# Patient Record
(demographics unavailable — no encounter records)

---

## 2024-10-08 NOTE — HISTORY OF PRESENT ILLNESS
[de-identified] : FLACA YEAGER is a 12 year old patient With obstructive sleep apnea and tonsil hypertrophy.  She is here for evaluation for tonsillectomy.  Her mother said that she has nasal obstruction, difficulty sleeping with gasping for air and pauses in her sleep.  She has occasional bedwetting and daytime fatigue.  She also has a history of recurrent tonsillitis with frequent infections.  She had a sleep study in the [place] last year.  She does not have the results with her but she said that there was severe obstructive sleep apnea.  They saw another ENT who recommended tonsillectomy but who does not perform the procedure.   No history of allergies She has no significant medical history otherwise

## 2024-10-08 NOTE — ASSESSMENT
[FreeTextEntry1] : She has hypertrophy of the tonsils and likely of the adenoids as well.  She has symptoms consistent with obstructive sleep apnea.  She suffers from recurrent tonsillitis.  Per her mother, sleep study last year showed obstructive sleep apnea.  She would like to proceed with tonsillectomy  Plan -Findings and management options were discussed with the patient.  We discussed adenotonsillectomy.  She was cautioned that it is possible Mitzy may have persistent sleep apnea after surgery because of her weight.  However, she does have significantly enlarged tonsils and would likely benefit from surgery.   I reviewed the procedure and the risks/benefits of the options.  I discussed partial versus complete tonsillectomy.  The risks of surgery were discussed in detail with the patient's mother (Mitzy was not present) and include but are not limited  to anesthesia complications, bleeding, infection, worsening or persistent symptoms, voice change, trouble swallowing, numbness of tongue, damage to teeth, unforeseen catastrophic consequences such as death.  She was told that there is pain following the surgery.  She will likely need a postop sleep study to ensure that the sleep apnea has resolved.  I will have her speak with my surgery scheduler.  My next available date is December or January.  I have also reached out to my colleague, Dr. Daniel, who may be able to schedule the surgery earlier.  The patient's mother would like to proceed a soon as possible.  His office will reach out to her.  If it can be arranged, he will perform the surgery.  If not, we will schedule it in December or January.  Her mother was asked to call me if she has any concerns.  She was also given Dr. Daniel's information.

## 2024-10-08 NOTE — PHYSICAL EXAM
[TextEntry] : PHYSICAL EXAM  General: The patient was alert, oriented and in no distress. Voice was clear.  Face: The patient had no facial asymmetry or mass. The skin was unremarkable.  Ears: Hearing normal to conversational voice External ears were normal without deformity. Ear canals were clear. No cerumen or inflammation Tympanic membranes were intact and normal. No perforation or effusion. mobile  Nose:  The external nose had no significant deformity. On anterior rhinoscopy, the nasal mucosa was clear.  The anterior septum was grossly midline. There were no visualized polyps, purulence  or masses.   Oral cavity: Oral mucosa- normal. Oral and base of tongue- clear and without mass. Gingival and buccal mucosa- moist and without lesions. Palate- the palate moved well. There was no cleft palate. There appeared to be good salivary flow.   Oral cavity/oropharynx- no pus, erythema or mass Mild retrognathia 3-4+ tonsils  Neck:  The neck was symmetrical. The parotid and submandibular glands were normal without masses. The trachea was midline and there was no unusual crepitus. Thyroid was smooth and nontender and no masses were palpated. No masses  Lymphatics: Cervical adenopathy- none.

## 2024-10-10 NOTE — CONSULT LETTER
[Dear  ___] : Dear  [unfilled], [Consult Letter:] : I had the pleasure of evaluating your patient, [unfilled]. [Please see my note below.] : Please see my note below. [Consult Closing:] : Thank you very much for allowing me to participate in the care of this patient.  If you have any questions, please do not hesitate to contact me. [Sincerely,] : Sincerely, [DrNya  ___] : Dr. LEMA [FreeTextEntry3] : STEFFI Daniel Jr, MD, FAAOHNS Otolaryngologist [facility]

## 2024-10-10 NOTE — HISTORY OF PRESENT ILLNESS
[de-identified] : Referral by Dr. Zaida luke the Mom wants a tonsillectomy done ASAP. Longstanding snoring & witnessed apneas that has been worsening over time. (+) daytime sleepiness; enuresis. Having difficulty at school. Per Mom she had [place] showing severe AMALIA (this isn't available for review).

## 2024-10-10 NOTE — PROCEDURE
[de-identified] : Indication: requirement for exam not possible via anterior examination; AMALIA After verbal consent and the administration of an aerosolized oxymetazoline/lidocaine mix, examination was performed with a flexible endoscope placed through the nose which was attached to a video monitoring system. Findings: Nasopharynx: 4+ adenoid Soft palate, lateral and posterior pharyngeal walls: prominent tonsillar obstruction Base of tongue & lingual tonsil: minimal retrodisplacement Vallecula: unremarkable Epiglottis: unremarkable Piriform sinuses and pharyngoesophageal junction: unremarkable Arytenoids and AE folds: mild interarytenoid edema Ventricle and false vocal folds: unremarkable True vocal folds: Smooth free edge; surface without ectasias or edema; normal movement bilaterally with good apposition in phonation Visible subglottis: unremarkable Narrow-band imaging: not used Other findings: none

## 2024-10-10 NOTE — PHYSICAL EXAM
[Laryngoscopy Performed] : laryngoscopy was performed, see procedure section for findings [Normal] : no masses and lesions seen, face is symmetric [de-identified] : 4+

## 2024-10-10 NOTE — ASSESSMENT
[FreeTextEntry1] : Preop done for tonsillectomy. The risks and benefits were fully reviewed including but not limited to: postoperative hemorrhage; velopharyngeal insufficiency; swallowing or speaking problems; asymmetric appearing palate. The patient would like to proceed. An educational perioperative care handout was given. We discussed judicious use of narcotics only if necessary due to her age  Reference #: [id number]